# Patient Record
Sex: FEMALE
[De-identification: names, ages, dates, MRNs, and addresses within clinical notes are randomized per-mention and may not be internally consistent; named-entity substitution may affect disease eponyms.]

---

## 2020-12-07 ENCOUNTER — HOSPITAL ENCOUNTER (OUTPATIENT)
Dept: HOSPITAL 61 - MAMMO | Age: 50
End: 2020-12-07
Attending: FAMILY MEDICINE
Payer: COMMERCIAL

## 2020-12-07 DIAGNOSIS — N64.89: ICD-10-CM

## 2020-12-07 DIAGNOSIS — Z12.31: Primary | ICD-10-CM

## 2020-12-07 PROCEDURE — 77067 SCR MAMMO BI INCL CAD: CPT

## 2020-12-08 NOTE — RAD
BILATERAL SCREENING MAMMOGRAM

 

History: Routine screening.

 

Comparison:  None. This exam is a baseline.

 

Technique: Routine bilateral digital mammogram views were obtained.

 

Findings: 

Breast Tissue Density C : The breasts are heterogeneously dense, which may

obscure small masses.

 

There are no dominant masses, suspicious microcalcifications, or 

architectural distortion.  Asymmetry is present in the central mid to 

posterior retroareolar right MLO view 5.1 cm from the nipple. This finding

measures up to 0.76 cm diameter. Small asymmetry in the central left 

retroareolar origin and left outer breast also are suggested.

 

IMPRESSION:

Slight compression imaging bilaterally is recommended. Ultrasound may be 

needed bilaterally.

 

BI-RADS Category 0:  Incomplete:  Need additional imaging evaluation.

 

The images were reviewed with computer aided detection.

 

Patient information is entered into the reminder system with a target due 

date for the next screening mammogram.

 

Mammography is the most sensitive method for finding small breast cancers,

but it does not detect them all and is not a substitute for careful 

clinical examination. A negative mammogram does not negate a clinically 

suspicious finding and should not result in delay in biopsying a 

clinically suspicious abnormality.

 

 "Our facility is accredited by the [employer] 

Mammography Program."

 

Electronically signed by: Jay Child MD (12/8/2020 3:12 PM) [id number]

## 2020-12-18 ENCOUNTER — HOSPITAL ENCOUNTER (OUTPATIENT)
Dept: HOSPITAL 61 - MAMMO | Age: 50
End: 2020-12-18
Attending: FAMILY MEDICINE
Payer: COMMERCIAL

## 2020-12-18 DIAGNOSIS — N60.41: Primary | ICD-10-CM

## 2020-12-18 PROCEDURE — 76641 ULTRASOUND BREAST COMPLETE: CPT

## 2020-12-18 PROCEDURE — 77066 DX MAMMO INCL CAD BI: CPT

## 2020-12-18 NOTE — RAD
Examination: MG DIAGNOSTIC BILAT, US BREAST RT



History: Reason: ABNORMAL MAMMOGRAM CALLBACK /  



Comparison/Correlation: 12/7/2020



Findings: Left compression imaging bilaterally was performed. No definite suspicious asymmetries are 
identified. Small asymmetry is questioned persists on the right MLO spot compression image of the upp
er central breast. This is likely lateral in location on CC image. This likely represents summation o
f breast parenchyma.



Breast Tissue Density B : There are scattered areas of fibroglandular density.



Ultrasound imaging of the right breast was performed 6 cm from the nipple at the 9:00 region. No mass
 identified. Ductal ectasia is noted in the subareolar region. Right axilla is unremarkable.



IMPRESSION:

No mammographic evidence of malignancy. Recommend routine screening.



BI-RADS category 1:  Negative.



The images were reviewed with computer-aided detection.



Patient information is entered into reminder system with a target due date for the next screening michell
mogram.



Mammography is the most sensitive method for finding small breast cancers, but it does not detect the
m all and is not a substitute for careful clinical examination. A negative mammogram does not negate 
a clinically suspicious finding and should not result in delay in biopsying a clinically suspicious a
bnormality.



 "Our facility is accredited by the [employer] Mammography Program."



Electronically signed by: Jay Child MD (12/18/2020 11:57 AM) [place] [id number]

## 2020-12-18 NOTE — RAD
Examination: MG DIAGNOSTIC BILAT, US BREAST RT



History: Reason: ABNORMAL MAMMOGRAM CALLBACK /  



Comparison/Correlation: 12/7/2020



Findings: Left compression imaging bilaterally was performed. No definite suspicious asymmetries are 
identified. Small asymmetry is questioned persists on the right MLO spot compression image of the upp
er central breast. This is likely lateral in location on CC image. This likely represents summation o
f breast parenchyma.



Breast Tissue Density B : There are scattered areas of fibroglandular density.



Ultrasound imaging of the right breast was performed 6 cm from the nipple at the 9:00 region. No mass
 identified. Ductal ectasia is noted in the subareolar region. Right axilla is unremarkable.



IMPRESSION:

No mammographic evidence of malignancy. Recommend routine screening.



BI-RADS category 1:  Negative.



The images were reviewed with computer-aided detection.



Patient information is entered into reminder system with a target due date for the next screening michell
mogram.



Mammography is the most sensitive method for finding small breast cancers, but it does not detect the
m all and is not a substitute for careful clinical examination. A negative mammogram does not negate 
a clinically suspicious finding and should not result in delay in biopsying a clinically suspicious a
bnormality.



 "Our facility is accredited by the [employer] Mammography Program."



Electronically signed by: Jay Child MD (12/18/2020 11:57 AM) [place] [id number]